# Patient Record
Sex: FEMALE | Race: WHITE
[De-identification: names, ages, dates, MRNs, and addresses within clinical notes are randomized per-mention and may not be internally consistent; named-entity substitution may affect disease eponyms.]

---

## 2022-02-02 NOTE — US
EXAMINATION TYPE: US extremity nonvasculr ltd LT

 

DATE OF EXAM: 2/1/2022

 

COMPARISON: NONE

 

CLINICAL HISTORY: M17.12 Unilateral primary osteoarthritis, left knee. Left knee pain for the past 2-
3 months with a prior history of DVT in same leg.

 

Left popiteal fossa area scanned and surrounding knee no mass or fluid collections seen.

 

 

 

 

 

 

IMPRESSION:  No evidence of popliteal fossa mass.

## 2022-02-02 NOTE — XR
EXAMINATION TYPE: XR knee complete LT

 

DATE OF EXAM: 2/1/2022

 

CLINICAL HISTORY: pain

 

TECHNIQUE:  Three views of the left knee are obtained.

 

COMPARISON: None.

 

FINDINGS:  There is no acute fracture/dislocation.  The tri-compartment joint spaces appear moderatel
y narrowed. Meniscal chondrocalcinosis seen. The overlying soft tissue appears unremarkable.

 

IMPRESSION:  

There is no acute fracture or dislocation

 

 

ICD 10 NO FRACTURE, INITIAL EVALUATION

## 2022-09-14 NOTE — CT
EXAMINATION TYPE: CT chest w con

CT DLP: 415.9 mGycm, Automated exposure control for dose reduction was used.

 

DATE OF EXAM: 9/12/2022 12:48 PM

 

COMPARISON: Chest radiograph 2/16/2022, CT chest 6/27/2018.

 

CLINICAL INDICATION:Female, 82 years old with history of R91.1 SOLITARY LUNG NODULE; PHH, f/u nodules


 

TECHNIQUE: Multiple axial images were obtained through the chest. Sagittal and coronal reformats were
 created for review.

Contrast used:100 mL of Isovue 300 with IV Contrast, none.

Oral contrast used: none.

 

FINDINGS: 

LUNGS/ PLEURA: No evidence of focal consolidation, pneumothorax or pleural effusion. Scattered reticu
lar opacities atelectasis seen within the lung bases. No suspicious pulmonary nodules. Few scattered 
groundglass opacities mostly peripherally noted.

AIRWAY: Patent , bronchiectasis seen within lung bases.

HEART: Heart is mildly enlarged for size. There is mild atherosclerosis of the coronary arteries.

MEDIASTINUM: No gross evidence of adenopathy. Small hiatal hernia is present.

VASCULATURE:  No aortic aneurysm. Dilated pulmonary trunk measuring up to 36 mm.

MUSCULOSKELETAL: No acute osseous abnormalities, multilevel disc degeneration changes throughout the

SOFT TISSUES/LYMPH NODES: Unremarkable.

LOWER NECK: No significant findings.

UPPER ABDOMEN: Scattered low density areas are seen within the liver which are very subtle. Example i
ncludes series 3 image 43 measuring up to 23 mm series 3 image 47 measuring 28 mm series 3 image 56, 
series 3 image 43 measuring 18 mm along with others there are more subtle. Scattered clonic diverticu
la present. Final duct particularly third portion of the duodenum.

 

IMPRESSION:

1.  Interstitial lung disease most pronounced along the periphery which could represent NSIP versus o
ther etiologies. Overall findings minimally progressed from 2018. 

2.  Low-density areas which are subtle seen throughout the liver, dedicated MRI/CT liver mass protoco
l is recommended to rule out underlying lesions.

3.  Pulmonary hypertension.

## 2022-10-05 ENCOUNTER — HOSPITAL ENCOUNTER (OUTPATIENT)
Dept: HOSPITAL 47 - RADMRIMAIN | Age: 82
Discharge: HOME | End: 2022-10-05
Attending: FAMILY MEDICINE
Payer: MEDICARE

## 2022-10-05 DIAGNOSIS — R93.5: Primary | ICD-10-CM

## 2022-10-05 PROCEDURE — 74183 MRI ABD W/O CNTR FLWD CNTR: CPT

## 2022-10-06 NOTE — MR
EXAMINATION TYPE: MR liver wo/w con

 

DATE OF EXAM: 10/5/2022

 

COMPARISON: None

 

HISTORY: Previous abnormal findings

 

CONTRAST: 

Standard multiplanar, multisequence MRI departmental protocol images were obtained without contrast a
nd with 9 mL intravenous Gadavist gadolinium contrast.  

 

There are numerous variable sized somewhat rounded areas of abnormal increased signal on the T1 image
s in the right and left lobe of the liver. These measure up to 2 cm. The lesions are not very visible
 on the T2 images and are not cysts. Spleen is intact. No significant enhancement. There is no eviden
ce of pancreatic mass. The stomach is intact.

 

No evidence of pleural effusion. Heart size is fairly normal. No sign of pericardial effusion.

 

There is no adrenal mass. Kidneys have normal size. No hydronephrosis. Ureters are not dilated. No si
gn of retroperitoneal adenopathy. There is normal enhancement of the portal venous system. No evidenc
e of ascites. No sign of a bowel obstruction.

 

IMPRESSION:

Numerous rounded foci without significant enhancement throughout the liver. This could be hypovascula
r metastatic disease. No dilated ducts.

## 2022-11-04 ENCOUNTER — HOSPITAL ENCOUNTER (OUTPATIENT)
Dept: HOSPITAL 47 - RADPETMAIN | Age: 82
Discharge: HOME | End: 2022-11-04
Attending: FAMILY MEDICINE
Payer: MEDICARE

## 2022-11-04 DIAGNOSIS — R93.5: Primary | ICD-10-CM

## 2022-11-04 PROCEDURE — 78815 PET IMAGE W/CT SKULL-THIGH: CPT

## 2022-11-07 NOTE — PE
EXAMINATION TYPE: PET CT fusion skull to thigh

 

DATE OF EXAM: 11/4/2022

 

COMPARISON: Most recent chest CT September 12, 2022. Liver MRI October 5, 2022

 

HISTORY: Abnormal imaging, retroperitoneal lesion.   

 

TECHNIQUE:  Following the intravenous administration of 8.98 mCi of F-18 FDG, whole body images are p
erformed from the skull base to the midthigh.  Images are reviewed on the computer in the coronal, ax
ial, and sagittal planes.  Reconstructed rotating images are created on independent workstation and r
eviewed on the computer.   A localization and attenuation correction CT is performed in conjunction w
ith the PET scan. Blood glucose level equals 91

 

SCAN: Initial Scan

 

FINDINGS: 

 

SKULL BASE AND NECK:  No areas of abnormal hypermetabolic uptake.

 

CHEST, MEDIASTINUM, AND HILAR REGION: No areas of abnormal hypermetabolic uptake. 

 

ABDOMEN AND PELVIS: Heterogeneous hypodense areas or possible masses throughout the liver are redemon
strated without abnormal hypermetabolic uptake. Normal excretion. No areas of abnormal hypermetabolic
 uptake.

 

OSSEOUS STRUCTURES: No areas of abnormal hypermetabolic uptake.

 

OTHER CT: There is complete opacification of the left maxillary sinus with wall thickening and sclero
sis suggests chronic sinusitis.

 

Cardiomegaly with enlarged pulmonary arteries suggesting pulmonary artery hypertension. Parenchymal r
eticulation and fibrotic change bilaterally redemonstrated.

 

Facet arthropathy in the lower lumbar spine is seen.

 

IMPRESSION: No areas of abnormal hypermetabolic uptake to suggest malignancy. Hypodense areas or mass
es throughout the liver are present on outside CT chest 2018 and described as present on prior CT abd
omen from 2014. This chronic presence along with absence of abnormal hypermetabolic uptake strongly s
upports a benign etiology.

## 2025-03-05 ENCOUNTER — HOSPITAL ENCOUNTER (OUTPATIENT)
Dept: HOSPITAL 47 - RADCTMAIN | Age: 85
Discharge: HOME | End: 2025-03-05
Attending: INTERNAL MEDICINE
Payer: MEDICARE

## 2025-03-05 DIAGNOSIS — J98.11: ICD-10-CM

## 2025-03-05 DIAGNOSIS — J84.9: Primary | ICD-10-CM

## 2025-03-05 DIAGNOSIS — J47.9: ICD-10-CM

## 2025-03-05 DIAGNOSIS — K44.9: ICD-10-CM

## 2025-03-05 DIAGNOSIS — I51.7: ICD-10-CM

## 2025-03-05 PROCEDURE — 71250 CT THORAX DX C-: CPT

## 2025-03-10 NOTE — CT
EXAMINATION TYPE: CT chest wo con

 

DATE OF EXAM: 3/5/2025 3:34 PM

 

COMPARISON: CT 9/12/2022.

 

CLINICAL INDICATION: Female, 84 years old with history of J84.9 INTERSTITIAL PULMONARY DISEASE, UNSPE
CIFIED; PHH, Interstitial pulmonary disease. High Resolution

 

TECHNIQUE: High-resolution CT with 1's millimeter cuts at 10 mm intervals. Multiple axial images were
 obtained through the chest. Sagittal and coronal reformats were created for review. MIP was performe
d on a separate workstation.

Contrast used: mL of (None if empty)

Oral contrast used: (None if empty)

CT DLP: 1010.60 mGycm, Automated exposure control for dose reduction was used.

 

FINDINGS: 

LUNGS: Scattered groundglass opacities with interstitial prominence throughout the lungs worse in the
 right lung apex. There is coarsened interstitium most pronounced in the lung bases. Findings worse i
n expiration. On inspiratory view the great vessels appearance slightly reduces particularly in the l
refugio No evidence for honeycombing at this time however dilated large airways are seen along the base o
f the lungs. No acute area of infiltrative or consolidative change.

LARGE AIRWAYS: Bronchial wall dilation noted in predominantly in the lung bases.

PLEURA: No pleural effusion or thickening. 

HEART: Cardiomegaly is demonstrated. Mild coronary artery calcifications present.

MEDIASTINUM: No gross evidence of adenopathy. Small hiatal hernia present.

VASCULATURE:  No aortic aneurysm. 

MUSCULOSKELETAL: No acute osseous abnormalities

SOFT TISSUES/LYMPH NODES: Unremarkable.

LOWER NECK: No significant findings.

UPPER ABDOMEN: No significant findings. 

 

IMPRESSION: 

1.  Interstitial lung disease with bronchiectasis. Correlate for sequela of prior infection. Mild ate
lectasis within the lung bases. Findings could represent early pulmonary fibrosis however this is not
 significantly changed from 2022. No focal airspace consolidation or suspicious mass.

2.  Cardiomegaly.

3.  Small hiatal hernia.

 

[facility], Workstation: [id number], 3/10/2025 1:52 PM